# Patient Record
Sex: FEMALE | Race: WHITE | Employment: FULL TIME | URBAN - METROPOLITAN AREA
[De-identification: names, ages, dates, MRNs, and addresses within clinical notes are randomized per-mention and may not be internally consistent; named-entity substitution may affect disease eponyms.]

---

## 2021-12-09 ENCOUNTER — HOSPITAL ENCOUNTER (EMERGENCY)
Facility: CLINIC | Age: 21
Discharge: HOME | End: 2021-12-09
Attending: EMERGENCY MEDICINE | Admitting: EMERGENCY MEDICINE
Payer: COMMERCIAL

## 2021-12-09 VITALS
HEART RATE: 122 BPM | TEMPERATURE: 97.8 F | BODY MASS INDEX: 20.49 KG/M2 | RESPIRATION RATE: 20 BRPM | WEIGHT: 120 LBS | OXYGEN SATURATION: 97 % | HEIGHT: 64 IN | DIASTOLIC BLOOD PRESSURE: 110 MMHG | SYSTOLIC BLOOD PRESSURE: 144 MMHG

## 2021-12-09 DIAGNOSIS — F10.920 ALCOHOL INTOXICATION, UNCOMPLICATED (H): ICD-10-CM

## 2021-12-09 PROCEDURE — 99283 EMERGENCY DEPT VISIT LOW MDM: CPT

## 2021-12-09 ASSESSMENT — MIFFLIN-ST. JEOR: SCORE: 1294.32

## 2021-12-09 NOTE — ED NOTES
Pt's friend called - offered another friends number for her to call for a ride ( this friend is in the [facility] ) her name is Candice - # [phone]

## 2021-12-09 NOTE — ED NOTES
Bed: [facility]  Expected date:   Expected time:   Means of arrival:   Comments:  411  20 F etoh from [facility]  155

## 2021-12-09 NOTE — DISCHARGE INSTRUCTIONS

## 2021-12-09 NOTE — ED TRIAGE NOTES
Pt was at the [facility] drinking- she apparently was [place] airport [place]- then [place] the mall to drink - urinated on herself and I am unsure who even called 911 or PD - sounds like PD were called and they wanted to get the pt on a stretcher pt would not get on the stretcher- pt brought in via EMS - not on a hold at this time- pt is able to walk and talk .   Pt is from [facility], but her dad is here waiting in the lobby.

## 2021-12-10 NOTE — ED PROVIDER NOTES
"  History     Chief Complaint:  Alcohol Intoxication       HPI   Oralia Rivas is a 20 year old female who presents via EMS for public intoxication.  She was at a local restaurant at the [facility] where she had consumed several [place] ice teas resulting in intoxication and inability to care for herself.  She had urinated on herself and was unable to ambulate and therefore police and EMS were called and she was transported to us.  The patient denies issues of pain, shortness of breath, or other concerns, simply noting that she had \"drank too much.\"  She is currently a student in [place] and had traveled to the [facility] for a job interview because she is planning to drop out of school.  Per her family, she has had several months of downward spiraling behaviors of intoxication and making bad choices.  However, the patient denies suicidal ideation, homicidal ideation, or any other concerns, simply requesting to go home.    Allergies:  No known drug allergies    Medications:    Hydroxyzine, Effexor    Past Medical History:    Anxiety and depression    Past Surgical History:    No past surgical history on file.     Family History:    family history is not on file.    Social History:  Patient is a college student.  She works as a .  She denies having an issue with alcohol or other drugs.    PCP: No Ref-Primary, Physician     Review of Systems  Pertinent positives and negatives as above.  A 14-point review of systems was performed with all other systems reviewed as negative.      Physical Exam   Patient Vitals for the past 24 hrs:   BP Temp Temp src Pulse Resp SpO2 Height Weight   12/09/21 1617 (!) 144/110 97.8  F (36.6  C) Temporal (!) 122 20 97 % 1.626 m (5' 4\") 54.4 kg (120 lb)        Physical Exam  Eye:  Pupils are equal, round, and reactive.  Extraocular movements intact.    ENT:  No rhinorrhea.  Moist mucus membranes.  Normal tongue and tonsil.    Cardiac:  Regular rate and rhythm.  No murmurs, " gallops, or rubs.    Pulmonary:  Clear to auscultation bilaterally.  No wheezes, rales, or rhonchi.    Abdomen:  Positive bowel sounds.  Abdomen is soft and non-distended, without focal tenderness.    Musculoskeletal:  Normal movement of all extremities without evidence for deficit.    Skin:  Warm and dry without rashes.    Neurologic:  Non-focal exam without asymmetric weakness or numbness.     Psychiatric: Patient is intoxicated but close to clinically sober.  She converses with me appropriately, minimizing her alcoholism or scenario today.    Emergency Department Course       Emergency Department Course:  Past medical records, nursing notes, and vitals reviewed.  I performed an exam of the patient and obtained history, as documented above.      Impression & Plan         Medical Decision Making:  This intoxicated 20-year-old presents for inability to care for self.  She is sobering by the time of my assessment, able to ambulate without assistance with only mild slurring of her speech.  She has poor insight into her alcoholism and poor choices.  Her father presented to the emergency department and I spoke with him at length along with his wife over the phone.  They are both very concerned about the poor choices that this patient is making.  They are requesting a 72-hour hold, though I explained that she does not meet any criteria to be held against her will as she is not homicidal, suicidal, or lacking decision-making capacity.  The patient agreed to speak with her dad, and they had a discourse for approximately 45 minutes.  At the end of this, she is in agreement to go home with her father and sober up there.  I otherwise see no other indications for more of a medical work-up.  I feel she is safe for discharge and I gave her outpatient resources.  I was very clear with her there should be no hesitation to return to us if she changes her mind about wanting more assistance for psychological or substance abuse  issues.    Diagnosis:    ICD-10-CM    1. Alcohol intoxication, uncomplicated (H)  F10.920         Discharge Medications:     Medication List      There are no discharge medications for this visit.          12/9/2021   Chad Trierweiler, MD Trierweiler, Chad A, MD  12/10/21 0929